# Patient Record
Sex: FEMALE | Race: OTHER | URBAN - METROPOLITAN AREA
[De-identification: names, ages, dates, MRNs, and addresses within clinical notes are randomized per-mention and may not be internally consistent; named-entity substitution may affect disease eponyms.]

---

## 2021-10-08 ENCOUNTER — OFFICE VISIT (OUTPATIENT)
Dept: FAMILY MEDICINE CLINIC | Age: 51
End: 2021-10-08

## 2021-10-08 VITALS
OXYGEN SATURATION: 96 % | DIASTOLIC BLOOD PRESSURE: 81 MMHG | TEMPERATURE: 98 F | HEART RATE: 60 BPM | BODY MASS INDEX: 30.55 KG/M2 | SYSTOLIC BLOOD PRESSURE: 138 MMHG | HEIGHT: 62 IN | WEIGHT: 166 LBS

## 2021-10-08 DIAGNOSIS — L50.0 URTICARIA DUE TO DRUG ALLERGY: Primary | ICD-10-CM

## 2021-10-08 DIAGNOSIS — T50.905A URTICARIA DUE TO DRUG ALLERGY: Primary | ICD-10-CM

## 2021-10-08 DIAGNOSIS — J45.30 MILD PERSISTENT ASTHMA WITHOUT COMPLICATION: ICD-10-CM

## 2021-10-08 PROCEDURE — 99214 OFFICE O/P EST MOD 30 MIN: CPT | Performed by: FAMILY MEDICINE

## 2021-10-08 RX ORDER — FAMOTIDINE 20 MG/1
20 TABLET, FILM COATED ORAL 2 TIMES DAILY
Qty: 28 TABLET | Refills: 0 | Status: SHIPPED | OUTPATIENT
Start: 2021-10-08 | End: 2021-10-22

## 2021-10-08 RX ORDER — ALBUTEROL SULFATE 90 UG/1
2 AEROSOL, METERED RESPIRATORY (INHALATION)
Qty: 2 EACH | Refills: 3 | Status: SHIPPED | OUTPATIENT
Start: 2021-10-08 | End: 2022-07-19 | Stop reason: SDUPTHER

## 2021-10-08 RX ORDER — PREDNISONE 20 MG/1
60 TABLET ORAL DAILY
Qty: 15 TABLET | Refills: 0 | Status: SHIPPED | OUTPATIENT
Start: 2021-10-08 | End: 2021-10-13

## 2021-10-08 RX ORDER — CETIRIZINE HCL 10 MG
10 TABLET ORAL 2 TIMES DAILY
Qty: 28 TABLET | Refills: 0 | Status: SHIPPED | OUTPATIENT
Start: 2021-10-08 | End: 2021-10-22

## 2021-10-08 RX ORDER — FLUTICASONE PROPIONATE AND SALMETEROL 100; 50 UG/1; UG/1
1 POWDER RESPIRATORY (INHALATION) EVERY 12 HOURS
Qty: 60 EACH | Refills: 2 | Status: SHIPPED | OUTPATIENT
Start: 2021-10-08 | End: 2022-07-19 | Stop reason: SDUPTHER

## 2021-10-08 NOTE — PROGRESS NOTES
Vadim Ramos is a 45 y.o. female   Chief Complaint   Patient presents with    Rash     x 1 week    Medication Refill         ASSESSMENT AND PLAN:    1. Urticaria due to drug allergy  Most likely a reaction to the shingrix vaccine. >Prednisone burst x 5 days  > Double histamine block with zyrtec and pepcid BID x 2 weeks  > F/U in 2 weeks if no improvement. - predniSONE (DELTASONE) 20 mg tablet; Take 60 mg by mouth daily for 5 days. Dispense: 15 Tablet; Refill: 0  - cetirizine (ZYRTEC) 10 mg tablet; Take 1 Tablet by mouth two (2) times a day for 14 days. Indications: hives  Dispense: 28 Tablet; Refill: 0  - famotidine (PEPCID) 20 mg tablet; Take 1 Tablet by mouth two (2) times a day for 14 days. Indications: urticaria  Dispense: 28 Tablet; Refill: 0    2. Mild persistent asthma without complication  Last exacerbation 6 months ago. Out of meds. Will send to [facility]. - albuterol (PROVENTIL HFA, VENTOLIN HFA, PROAIR HFA) 90 mcg/actuation inhaler; Take 2 Puffs by inhalation every four (4) hours as needed for Wheezing, Shortness of Breath, Respiratory Distress or Cough. Indications: asthma attack  Dispense: 2 Each; Refill: 3  - fluticasone propion-salmeteroL (ADVAIR/WIXELA) 100-50 mcg/dose diskus inhaler; Take 1 Puff by inhalation every twelve (12) hours for 90 days. Indications: controller medication for asthma  Dispense: 60 Each; Refill: 2          SUBJECTIVE:    HPI:  Vadim Ramos is a 45 y.o. female who presents with hives. She got the Shingrix vaccine [id number] and less than a week later developed full body urticaria that is very itchy and preventing her from sleeping. A friend recommended Alerfin 4mg and she took 8mg TID for one day but did not respond at all (It is a 1st generation H2 blocker). Lesions are not tender, only extremely itchy. PMH: Asthma  PSH: cholecystectomy, C/S x 3  Meds; Advair /50.  Albuterol q4PRN  SH: denies  FH: no signifcant  ALL: NKDA (though may consider Shingrix-- no prior reactions to vaccines)    Asthma has been well controlled, though now out of advair and starting to feel like her breathing is worsening. Review of Systems   Constitutional: Negative for fever and malaise/fatigue. HENT:        Mmm and nonedematous. Eyes: Negative for blurred vision. Respiratory: Negative for cough and shortness of breath. Cardiovascular: Negative for chest pain, palpitations and leg swelling. Gastrointestinal: Negative for abdominal pain, constipation, diarrhea, nausea and vomiting. Skin: Positive for rash. Erythematous hives varying in size distributed throughout body. Non on palmes or soles. No lesions in mouth. Neurological: Negative for dizziness and headaches. /81 (BP 1 Location: Left upper arm, BP Patient Position: Sitting)   Pulse 60   Temp 98 °F (36.7 °C) (Oral)   Ht 5' 2.21\" (1.58 m)   Wt 166 lb (75.3 kg)   SpO2 96%   BMI 30.16 kg/m²     Physical Exam  Constitutional:       General: She is not in acute distress. Appearance: Normal appearance. HENT:      Head: Normocephalic and atraumatic. Mouth/Throat:      Mouth: Mucous membranes are moist.      Pharynx: Oropharynx is clear. No oropharyngeal exudate or posterior oropharyngeal erythema. Comments: Mmm and nonedematous. Eyes:      Extraocular Movements: Extraocular movements intact. Conjunctiva/sclera: Conjunctivae normal.      Pupils: Pupils are equal, round, and reactive to light. Neck:      Vascular: No carotid bruit. Cardiovascular:      Rate and Rhythm: Normal rate and regular rhythm. Pulses: Normal pulses. Heart sounds: Normal heart sounds. Pulmonary:      Effort: Pulmonary effort is normal.      Breath sounds: Normal breath sounds. Abdominal:      General: Bowel sounds are normal. There is no distension. Palpations: Abdomen is soft. Tenderness: There is no abdominal tenderness.    Musculoskeletal: General: Normal range of motion. Cervical back: No tenderness. Right lower leg: No edema. Left lower leg: No edema. Lymphadenopathy:      Cervical: No cervical adenopathy. Skin:     General: Skin is warm. Findings: Rash (hives of varying sizes throughout body sparing palms, soles and mucous membranes) present. Neurological:      Mental Status: She is alert and oriented to person, place, and time.       Gait: Gait normal.      Deep Tendon Reflexes: Reflexes normal.   Psychiatric:         Behavior: Behavior normal.

## 2021-10-08 NOTE — PROGRESS NOTES
Informed patient to call [facility] to schedule medication , patient verbalized understanding. An After Visit Summary was printed and reviewed with the patient.   Kris Cueva

## 2021-10-12 ENCOUNTER — TELEPHONE (OUTPATIENT)
Dept: FAMILY MEDICINE CLINIC | Age: 51
End: 2021-10-12

## 2021-10-12 NOTE — TELEPHONE ENCOUNTER
Patient has Ventolin and Advair sent to [address] [facility] on 10/8 but I am not sure if she is a current patient. I have sent an email to [facility] to check as to whether patient is current or needs to renew. Cherise Barrientos RN  [facility] responded back that patient is not their patient yet. I have sent a message to [employer] to please schedule patient for application for [place] pharmacy.  Cherise Barrientos RN

## 2021-10-13 ENCOUNTER — VIRTUAL VISIT (OUTPATIENT)
Dept: FAMILY MEDICINE CLINIC | Age: 51
End: 2021-10-13

## 2021-10-13 DIAGNOSIS — Z71.89 COUNSELING AND COORDINATION OF CARE: Primary | ICD-10-CM

## 2021-10-19 ENCOUNTER — HOSPITAL ENCOUNTER (OUTPATIENT)
Dept: LAB | Age: 51
Discharge: HOME | End: 2021-10-19

## 2021-10-19 ENCOUNTER — OFFICE VISIT (OUTPATIENT)
Dept: FAMILY MEDICINE CLINIC | Age: 51
End: 2021-10-19

## 2021-10-19 VITALS
DIASTOLIC BLOOD PRESSURE: 62 MMHG | HEART RATE: 69 BPM | HEIGHT: 62 IN | WEIGHT: 168 LBS | SYSTOLIC BLOOD PRESSURE: 112 MMHG | TEMPERATURE: 97.6 F | OXYGEN SATURATION: 99 % | BODY MASS INDEX: 30.91 KG/M2

## 2021-10-19 DIAGNOSIS — L50.9 URTICARIA: ICD-10-CM

## 2021-10-19 DIAGNOSIS — L50.9 URTICARIA: Primary | ICD-10-CM

## 2021-10-19 PROCEDURE — 85025 COMPLETE CBC W/AUTO DIFF WBC: CPT

## 2021-10-19 PROCEDURE — 99214 OFFICE O/P EST MOD 30 MIN: CPT | Performed by: NURSE PRACTITIONER

## 2021-10-19 RX ORDER — HYDROXYZINE 50 MG/1
50 TABLET, FILM COATED ORAL
Qty: 30 TABLET | Refills: 0 | Status: SHIPPED | OUTPATIENT
Start: 2021-10-19 | End: 2021-10-29

## 2021-10-19 RX ORDER — PREDNISONE 10 MG/1
TABLET ORAL
Qty: 63 TABLET | Refills: 0 | Status: SHIPPED | OUTPATIENT
Start: 2021-10-19 | End: 2021-10-19 | Stop reason: SDUPTHER

## 2021-10-19 RX ORDER — HYDROXYZINE 50 MG/1
50 TABLET, FILM COATED ORAL
Qty: 30 TABLET | Refills: 0 | Status: SHIPPED | OUTPATIENT
Start: 2021-10-19 | End: 2021-10-19 | Stop reason: SDUPTHER

## 2021-10-19 RX ORDER — PREDNISONE 10 MG/1
TABLET ORAL
Qty: 63 TABLET | Refills: 0 | Status: SHIPPED | OUTPATIENT
Start: 2021-10-19 | End: 2022-07-19 | Stop reason: ALTCHOICE

## 2021-10-19 NOTE — PROGRESS NOTES
10/19/2021 : Kashif Ceron (: 1970) is a 45 y.o. female, established patient, here for evaluation of the following chief complaint(s):  Follow-up (Rash x1 month )     ASSESSMENT/PLAN:  Below is the assessment and plan developed based on review of pertinent history, physical exam, labs, studies, and medications. 1. Urticaria  -     CBC WITH AUTOMATED DIFF; Future  -     REFERRAL TO ALLERGY  -     hydrOXYzine HCL (ATARAX) 50 mg tablet; Take 1 Tablet by mouth three (3) times daily as needed for Itching for up to 10 days. , Normal, Disp-30 Tablet, R-0  -     predniSONE (DELTASONE) 10 mg tablet; 6 po qd x 3d; 5 po qd x 3d; 4 po qd x 3d; 3 po qd x 3d; 2 po qd x 3d; 1 po qd x 3d, Normal, Disp-63 Tablet, R-0    No follow-ups on file. SUBJECTIVE/OBJECTIVE:  HPI   The rash did get better at first for 2 days and then it came back. She had the second shingles vaccine on . This happened on . No results found for any visits on 10/19/21. Review of Systems: Negative for: fever, chest pain, shortness of breath    Social History:  reports that she has never smoked. She has never used smokeless tobacco. She reports that she does not drink alcohol and does not use drugs. Current Medications:   Current Outpatient Medications   Medication Sig    hydrOXYzine HCL (ATARAX) 50 mg tablet Take 1 Tablet by mouth three (3) times daily as needed for Itching for up to 10 days.  predniSONE (DELTASONE) 10 mg tablet 6 po qd x 3d; 5 po qd x 3d; 4 po qd x 3d; 3 po qd x 3d; 2 po qd x 3d; 1 po qd x 3d    cetirizine (ZYRTEC) 10 mg tablet Take 1 Tablet by mouth two (2) times a day for 14 days. Indications: hives    famotidine (PEPCID) 20 mg tablet Take 1 Tablet by mouth two (2) times a day for 14 days.  Indications: urticaria    albuterol (PROVENTIL HFA, VENTOLIN HFA, PROAIR HFA) 90 mcg/actuation inhaler Take 2 Puffs by inhalation every four (4) hours as needed for Wheezing, Shortness of Breath, Respiratory Distress or Cough. Indications: asthma attack    fluticasone propion-salmeteroL (ADVAIR/WIXELA) 100-50 mcg/dose diskus inhaler Take 1 Puff by inhalation every twelve (12) hours for 90 days. Indications: controller medication for asthma     Physical Examination:             Vitals:    10/19/21 1319   BP: 112/62   Pulse: 69   Temp: 97.6 °F (36.4 °C)   TempSrc: Temporal   SpO2: 99%   Weight: 168 lb (76.2 kg)   Height: 5' 1.61\" (1.565 m)    Patient's last menstrual period was 05/01/2019 (exact date). General appearance - well developed, no acute distress. Chest - clear to auscultation. Heart - regular rate and rhythm without murmurs, rubs, or gallops. Abdomen - bowel sounds present x 4, NT, ND  Extremities - no CCE. Integumentary - differential would include pityriasis rosea. An electronic signature was used to authenticate this note.   -- Josie Jolly NP

## 2021-10-19 NOTE — PROGRESS NOTES
Coordination of Care  1. Have you been to the ER, urgent care clinic since your last visit? Hospitalized since your last visit? No    2. Have you seen or consulted any other health care providers outside of the [address] [place] [address] since your last visit? Include any pap smears or colon screening. No    Does the patient need refills? NO    Learning Assessment Complete?  yes  Depression Screening complete in the past 12 months? yes

## 2021-10-19 NOTE — PROGRESS NOTES
I have printed AVS and reviewed it with patient today. I have copied the provider's check out note here and have reviewed these items with the patient today: Check-out Note: NO vaccines today.  Two new medicines. Please print AVS.  I reviewed the patients medications with her and the referral to [facility]. Patient verbalized understanding.  Willa Grant RN

## 2021-10-20 ENCOUNTER — VIRTUAL VISIT (OUTPATIENT)
Dept: FAMILY MEDICINE CLINIC | Age: 51
End: 2021-10-20

## 2021-10-20 DIAGNOSIS — Z71.89 COUNSELING AND COORDINATION OF CARE: Primary | ICD-10-CM

## 2021-10-20 LAB
BASOPHILS # BLD: 0 K/UL (ref 0–0.1)
BASOPHILS NFR BLD: 0 % (ref 0–1)
DIFFERENTIAL METHOD BLD: ABNORMAL
EOSINOPHIL # BLD: 0.3 K/UL (ref 0–0.4)
EOSINOPHIL NFR BLD: 3 % (ref 0–7)
ERYTHROCYTE [DISTWIDTH] IN BLOOD BY AUTOMATED COUNT: 15.4 % (ref 11.5–14.5)
HCT VFR BLD AUTO: 40.3 % (ref 35–47)
HGB BLD-MCNC: 13.3 G/DL (ref 11.5–16)
IMM GRANULOCYTES # BLD AUTO: 0 K/UL (ref 0–0.04)
IMM GRANULOCYTES NFR BLD AUTO: 0 % (ref 0–0.5)
LYMPHOCYTES # BLD: 2.7 K/UL (ref 0.8–3.5)
LYMPHOCYTES NFR BLD: 33 % (ref 12–49)
MCH RBC QN AUTO: 27 PG (ref 26–34)
MCHC RBC AUTO-ENTMCNC: 33 G/DL (ref 30–36.5)
MCV RBC AUTO: 81.9 FL (ref 80–99)
MONOCYTES # BLD: 0.9 K/UL (ref 0–1)
MONOCYTES NFR BLD: 11 % (ref 5–13)
NEUTS SEG # BLD: 4.3 K/UL (ref 1.8–8)
NEUTS SEG NFR BLD: 53 % (ref 32–75)
NRBC # BLD: 0 K/UL (ref 0–0.01)
NRBC BLD-RTO: 0 PER 100 WBC
PLATELET # BLD AUTO: 320 K/UL (ref 150–400)
PMV BLD AUTO: 11.3 FL (ref 8.9–12.9)
RBC # BLD AUTO: 4.92 M/UL (ref 3.8–5.2)
WBC # BLD AUTO: 8.2 K/UL (ref 3.6–11)

## 2021-10-20 NOTE — PROGRESS NOTES
Patient has been screened and informed she has financial screening for [facility] and [facility]. Patient has been scheduled for [facility] to complete financial screenings.

## 2021-10-21 ENCOUNTER — OFFICE VISIT (OUTPATIENT)
Dept: FAMILY MEDICINE CLINIC | Age: 51
End: 2021-10-21

## 2021-10-21 DIAGNOSIS — Z71.89 COUNSELING AND COORDINATION OF CARE: Primary | ICD-10-CM

## 2021-10-21 PROCEDURE — 99080 SPECIAL REPORTS OR FORMS: CPT | Performed by: FAMILY MEDICINE

## 2021-10-22 NOTE — PROGRESS NOTES
Financial screening for [facility] and [facility] has been completed. Patient has been instructed to call [facility] appointment line on or after 11/4/21. SDOHQ was provided to patient by OW. Patient marked as a primary need: Food scored (3) resources were provided by OW. Health Insurance scored (3) Children have medicaid, mom does not qualify. Housing scored (5) Mom does not have a SS#. OW has explained to patient every category she has marked. Patient verbalized understanding.

## 2021-10-27 ENCOUNTER — DOCUMENTATION ONLY (OUTPATIENT)
Dept: FAMILY MEDICINE CLINIC | Age: 51
End: 2021-10-27

## 2021-10-27 NOTE — PROGRESS NOTES
Patient's [place] pharmacy completed financial screening has been sent via fax to [facility]. A message has been sent to Sarina Woodard.

## 2021-10-28 NOTE — TELEPHONE ENCOUNTER
With Abbie Trejo as , I called patient to tell her application was faxed to [facility]. This patient has completed the [facility] financial screening paperwork and is now eligible through 10/27/22. I did not have to order medications as they were already  sent  to the [facility] pharmacy. I have also sent an email to Joanna Perry at the [facility] pharmacy to let her know patient's name, date of birth, eligibility dates and medications requested. I have also told Yonas Steven that patient prefers the [facility] location to  medication(s).  Gregg Jo RN

## 2021-10-29 ENCOUNTER — DOCUMENTATION ONLY (OUTPATIENT)
Dept: FAMILY MEDICINE CLINIC | Age: 51
End: 2021-10-29

## 2021-10-29 NOTE — PROGRESS NOTES
Email sent to our contact at [facility], to let her know about the patient's two prescriptions for [facility]: Albuterol HFA and Advair Diskus inhaler. The patient's financial screening was sent to [facility] recently by Tony Mark. [facility] will contact the patient when her prescriptions are ready to be picked-up.  France Brady RN

## 2021-12-29 ENCOUNTER — DOCUMENTATION ONLY (OUTPATIENT)
Dept: FAMILY MEDICINE CLINIC | Age: 51
End: 2021-12-29

## 2022-01-24 ENCOUNTER — TELEPHONE (OUTPATIENT)
Dept: FAMILY MEDICINE CLINIC | Age: 52
End: 2022-01-24

## 2022-01-24 NOTE — TELEPHONE ENCOUNTER
MIMA HERCULES called pt to f/up with [facility]. Patient said she has all of the information but cannot make it to the food bank because she depends on someone else's transportation. OW provided resources information again. Food score 4, Transportation 4. Housing 5. Health Ins (not eligible) Judi have Medicaid. Pt asked about her medications. She said she never got a call back and was not able to get her medication for asthma. Staff message sent to Ernesto Pickard.

## 2022-01-25 ENCOUNTER — TELEPHONE (OUTPATIENT)
Dept: FAMILY MEDICINE CLINIC | Age: 52
End: 2022-01-25

## 2022-01-25 NOTE — TELEPHONE ENCOUNTER
----- Message from Mey Ignacio sent at 1/24/2022  2:19 PM EST -----  Regarding: Question about medications  Ivis,   This patient asked about her medications. She said she never got a call back and was not able to get her medication for asthma. GERG Goss

## 2022-01-25 NOTE — TELEPHONE ENCOUNTER
01-24-22: Per chart review, the patient's financial screening for [facility] pharmacy and the med order for Albuterol inhalers were sent to [facility] in October, 2021. Email sent to our contact at [facility] pharmacy to check on the status of the patient's Albuterol inhalers. [facility] pharmacy replied that they filled the prescription for the patient in October, but it was never picked up. They will fill the patient's Albuterol inhaler prescription again and contact the patient to let her know when it is ready. Routing to [facility] to let her know.  Sreekanth Ritchie RN

## 2022-01-27 ENCOUNTER — TELEPHONE (OUTPATIENT)
Dept: FAMILY MEDICINE CLINIC | Age: 52
End: 2022-01-27

## 2022-01-27 NOTE — TELEPHONE ENCOUNTER
OW called pt to f/up with [facility]. Unable to speak with pt. OW left a message saying patient will receive a call when prescription is ready to be picked up.

## 2022-07-14 ENCOUNTER — TELEPHONE (OUTPATIENT)
Dept: FAMILY MEDICINE CLINIC | Age: 52
End: 2022-07-14

## 2022-07-14 NOTE — TELEPHONE ENCOUNTER
Tc from the pt 2x in a row. She LM 1x. The  Lizzy Singh assisted with the call. The pt was calling for an appt on the cbn phone. She stated she woke up this morning and her right eye was red in it like blood on the white part. Denied severe pain stated a little pain, denied itching, denied blurry vision or loss of vision, denied any drainage coming out of the eye. The ED precautions and when to go to the ED or urgent care was reviewed with the pt. Any symptoms severe pain, itching, vision loss or blurry vision, or drainage. Any injury to the eye she should go to the ED or urgent care. The pt's last [facility] provider visit was 10/19/21. The pt was given the appt line phone # and the same day appt line protocol.  Sanjeev Rabago RN

## 2022-07-19 ENCOUNTER — VIRTUAL VISIT (OUTPATIENT)
Dept: FAMILY MEDICINE CLINIC | Age: 52
End: 2022-07-19

## 2022-07-19 DIAGNOSIS — H11.31 SCLERAL HEMORRHAGE OF RIGHT EYE: Primary | ICD-10-CM

## 2022-07-19 DIAGNOSIS — J45.30 MILD PERSISTENT ASTHMA WITHOUT COMPLICATION: ICD-10-CM

## 2022-07-19 PROCEDURE — 99213 OFFICE O/P EST LOW 20 MIN: CPT | Performed by: FAMILY MEDICINE

## 2022-07-19 RX ORDER — FLUTICASONE PROPIONATE AND SALMETEROL 100; 50 UG/1; UG/1
1 POWDER RESPIRATORY (INHALATION) EVERY 12 HOURS
Qty: 60 EACH | Refills: 2 | Status: SHIPPED | OUTPATIENT
Start: 2022-07-19 | End: 2022-10-27 | Stop reason: SDUPTHER

## 2022-07-19 RX ORDER — ALBUTEROL SULFATE 90 UG/1
2 AEROSOL, METERED RESPIRATORY (INHALATION)
Qty: 18 G | Refills: 1 | Status: SHIPPED | OUTPATIENT
Start: 2022-07-19

## 2022-07-19 NOTE — PROGRESS NOTES
Lance Soliman (: 1970) is a 47 y.o. female, established patient, Virtual Visit for evaluation of the following chief complaint(s):   Asthma (f/up), Eye Problem (Pt wear glasses since ), and Medication Refill (Asthma meds. Pt used to receive the meds from [facility])       ASSESSMENT/PLAN:  1. Scleral hemorrhage of right eye  Resolving, follow up if it does not resolve or if any other vision changes occur. 2. Mild persistent asthma without complication  Renewed Albuterol and Advair.  -     albuterol (PROVENTIL HFA, VENTOLIN HFA, PROAIR HFA) 90 mcg/actuation inhaler; Take 2 Puffs by inhalation every four (4) hours as needed for Wheezing, Shortness of Breath, Respiratory Distress or Cough. Indications: asthma attack, Normal, Disp-18 g, R-1  -     fluticasone propion-salmeteroL (ADVAIR/WIXELA) 100-50 mcg/dose diskus inhaler; Take 1 Puff by inhalation every twelve (12) hours for 90 days. Indications: controller medication for asthma, Normal, Disp-60 Each, R-2      Return in about 3 months (around 10/19/2022) for follow up for F2F in 3 months for asthma. SUBJECTIVE/OBJECTIVE:  HPI  Doxy. me visit. Eye problem: 5 days of Rt eye being red. No trauma, discharge. No pain or vision loss. It has improved. Asthma:  Denies any worsening cough, wheezing, dyspnea. Uses Albuterol PRN and needs both Albuterol and Advair renewed. Review of Systems     Patient-Reported LMP: Menoupase    Physical Exam  CONSTITUTIONAL:  Well developed. No apparent distress. EYE: EOEMi, PERRL. No photophobia. Lids without swelling. Rt Sclera with 5-7mm red area in lower, medial quadrant with surrounding green discoloration. No cornea abnormalities noted. RESPIRATORY:  Normal effort. Lance Soliman, was evaluated through a synchronous (real-time) audio-video encounter. The patient (or guardian if applicable) is aware that this is a billable service, which includes applicable co-pays.  This Virtual Visit was conducted with patient's (and/or legal guardian's) consent. The visit was conducted pursuant to the emergency declaration under the [facility] [address] and the [employer] Act. Patient identification was verified, and a caregiver was present when appropriate. The patient was located at: Home: [address] [place] [address]  The provider was located at: Home:      Patient identification was verified at the start of the visit: YES    Services were provided through a phone synchronous discussion virtually to substitute for in-person clinic visit. Patient was located at home and provider was located in office or at home. An electronic signature was used to authenticate this note.   -- Joseline Muller MD

## 2022-07-19 NOTE — Clinical Note
Advair Rx sent to [facility]. I see that she needed to renew her application to that pharmacy and told her we tried to contact her. She will try to be available.

## 2022-07-19 NOTE — PROGRESS NOTES
Patient states she is at home and would like for provider to call ahead of appointment time. Patient does not have access to vital sign equipment. Coordination of Care  1. Have you been to the ER, urgent care clinic since your last visit? Hospitalized since your last visit? No    2. Have you seen or consulted any other health care providers outside of the [address] [place] [address] since your last visit? Include any pap smears or colon screening. No    Does the patient need refills? YES    Learning Assessment Complete? yes  Depression Screening complete in the past 12 months? yes     [id number]: MIKEY called the patient to explain the [facility] pharmacy process. I explain that she will receive a phone call from the  because the CVAN has not  seen her in almost 2 years and she will need the financial screening. Patient stated she is no working right now. Patient verbalized understanding. MIKEY sent an staff message to Bren Martins RN to check he patient status with [facility] pharmacy  [id number] pm: MIKEY called the patient again to explain the following note per Bren Martins RN, notes: \"The patient's Rx for Albuterol was sent to [address] [place] [address]. It is no longer available at [facility] pharmacy or through [facility]. The patient's Rx for Advair was sent today to [facility] pharmacy. I will follow-up with the patient to see if she would rather apply to [facility] to receive the Advair for free or renew her enrollment soon in [facility] so she can purchase it there. \" MIKEY sent the Good rx coupon to the patient and confirmed receipt of the coupon and explained the coupon process.  Patient verbalized understanding

## 2022-10-27 ENCOUNTER — OFFICE VISIT (OUTPATIENT)
Dept: FAMILY MEDICINE CLINIC | Age: 52
End: 2022-10-27

## 2022-10-27 ENCOUNTER — HOSPITAL ENCOUNTER (OUTPATIENT)
Dept: LAB | Age: 52
Discharge: HOME | End: 2022-10-27

## 2022-10-27 VITALS
HEIGHT: 62 IN | RESPIRATION RATE: 20 BRPM | SYSTOLIC BLOOD PRESSURE: 117 MMHG | DIASTOLIC BLOOD PRESSURE: 67 MMHG | BODY MASS INDEX: 31.21 KG/M2 | OXYGEN SATURATION: 99 % | WEIGHT: 169.6 LBS | TEMPERATURE: 97.7 F | HEART RATE: 69 BPM

## 2022-10-27 DIAGNOSIS — L50.9 URTICARIA: Primary | ICD-10-CM

## 2022-10-27 DIAGNOSIS — Z23 NEEDS FLU SHOT: ICD-10-CM

## 2022-10-27 DIAGNOSIS — L50.9 URTICARIA: ICD-10-CM

## 2022-10-27 DIAGNOSIS — J45.30 MILD PERSISTENT ASTHMA WITHOUT COMPLICATION: ICD-10-CM

## 2022-10-27 PROCEDURE — 84443 ASSAY THYROID STIM HORMONE: CPT

## 2022-10-27 PROCEDURE — 82607 VITAMIN B-12: CPT

## 2022-10-27 PROCEDURE — 81003 URINALYSIS AUTO W/O SCOPE: CPT

## 2022-10-27 PROCEDURE — 85025 COMPLETE CBC W/AUTO DIFF WBC: CPT

## 2022-10-27 PROCEDURE — 80053 COMPREHEN METABOLIC PANEL: CPT

## 2022-10-27 PROCEDURE — 99214 OFFICE O/P EST MOD 30 MIN: CPT | Performed by: FAMILY MEDICINE

## 2022-10-27 PROCEDURE — 36415 COLL VENOUS BLD VENIPUNCTURE: CPT

## 2022-10-27 PROCEDURE — 82728 ASSAY OF FERRITIN: CPT

## 2022-10-27 RX ORDER — FLUTICASONE PROPIONATE AND SALMETEROL 100; 50 UG/1; UG/1
1 POWDER RESPIRATORY (INHALATION) EVERY 12 HOURS
Qty: 60 EACH | Refills: 3
Start: 2022-10-27 | End: 2022-11-17 | Stop reason: SDUPTHER

## 2022-10-27 RX ORDER — FLUTICASONE PROPIONATE AND SALMETEROL 100; 50 UG/1; UG/1
1 POWDER RESPIRATORY (INHALATION) EVERY 12 HOURS
COMMUNITY
End: 2022-10-27 | Stop reason: SDUPTHER

## 2022-10-27 NOTE — PROGRESS NOTES
Chief Complaint   Patient presents with    Asthma     F/up chronic condition; Pt cites no major changes    Skin Problem     Has hives/uticaria on various areas of her body - was seen for this in the past; not as severe as before, but her skin itches and it's bothersome; pt also c/o foul smell coming from vagina, but no discharge, no dysuria, no itchiness. Immunization/Injection     Wants flu shot       Visit Vitals  /67 (BP 1 Location: Left upper arm, BP Patient Position: Sitting)   Pulse 69   Temp 97.7 °F (36.5 °C) (Temporal)   Resp 20   Ht 5' 1.61\" (1.565 m)   Wt 169 lb 9.6 oz (76.9 kg)   SpO2 99%   BMI 31.41 kg/m²       Coordination of Care  1. Have you been to the ER, urgent care clinic since your last visit? Hospitalized since your last visit? No    2. Have you seen or consulted any other health care providers outside of the [address] [place] [address] since your last visit? Include any pap smears or colon screening. No    Does the patient need refills? YES Advair     Learning Assessment Complete?  yes  Depression Screening complete in the past 12 months? yes

## 2022-10-27 NOTE — PROGRESS NOTES
[facility] seen at d/c, full name and  verified. After Visit Summary provided and reviewed along with discharge instructions and when it is recommended to come back. Advised patient to schedule follow up appointment before she leaves today. Reviewed medication list with the patient to ensure she knows how to and when to take her medications. Side effects, mechanisms of action and medication compliance were reiterated to ensure proper understanding. Educated patient that someone from the [facility] Coordination team will contact her to go over the application process as well as [facility]. Time for questions and answers provided, patient verbalizes understanding.

## 2022-10-27 NOTE — PATIENT INSTRUCTIONS
Vaccine Information Statement    Influenza (Flu) Vaccine (Inactivated or Recombinant): What You Need to Know    Many vaccine information statements are available in Green[place]ic and other languages. See www.immunize.org/vis. Hojas de información sobre vacunas están disponibles en español y en muchos otros idiomas. Visite www.immunize.org/vis. 1. Why get vaccinated? Influenza vaccine can prevent influenza (flu). Flu is a contagious disease that spreads around the [facility] every year, usually between October and May. Anyone can get the flu, but it is more dangerous for some people. Infants and young children, people 72 years and older, pregnant people, and people with certain health conditions or a weakened immune system are at greatest risk of flu complications. Pneumonia, bronchitis, sinus infections, and ear infections are examples of flu-related complications. If you have a medical condition, such as heart disease, cancer, or diabetes, flu can make it worse. Flu can cause fever and chills, sore throat, muscle aches, fatigue, cough, headache, and runny or stuffy nose. Some people may have vomiting and diarrhea, though this is more common in children than adults. In an average year, thousands of people in the [facility] die from flu, and many more are hospitalized. Flu vaccine prevents millions of illnesses and flu-related visits to the doctor each year. 2. Influenza vaccines     CDC recommends everyone 6 months and older get vaccinated every flu season. Children 6 months through 6years of age may need 2 doses during a single flu season. Everyone else needs only 1 dose each flu season. It takes about 2 weeks for protection to develop after vaccination. There are many flu viruses, and they are always changing. Each year a new flu vaccine is made to protect against the influenza viruses believed to be likely to cause disease in the upcoming flu season.  Even when the vaccine doesnt exactly match these viruses, it may still provide some protection. Influenza vaccine does not cause flu. Influenza vaccine may be given at the same time as other vaccines. 3. Talk with your health care provider    Tell your vaccination provider if the person getting the vaccine:  Has had an allergic reaction after a previous dose of influenza vaccine, or has any severe, life-threatening allergies   Has ever had Guillain-Barré Syndrome (also called GBS)    In some cases, your health care provider may decide to postpone influenza vaccination until a future visit. Influenza vaccine can be administered at any time during pregnancy. People who are or will be pregnant during influenza season should receive inactivated influenza vaccine. People with minor illnesses, such as a cold, may be vaccinated. People who are moderately or severely ill should usually wait until they recover before getting influenza vaccine. Your health care provider can give you more information. 4. Risks of a vaccine reaction    Soreness, redness, and swelling where the shot is given, fever, muscle aches, and headache can happen after influenza vaccination. There may be a very small increased risk of Guillain-Barré Syndrome (GBS) after inactivated influenza vaccine (the flu shot). Iv[place] Scripture children who get the flu shot along with pneumococcal vaccine (PCV13) and/or DTaP vaccine at the same time might be slightly more likely to have a seizure caused by fever. Tell your health care provider if a child who is getting flu vaccine has ever had a seizure. People sometimes faint after medical procedures, including vaccination. Tell your provider if you feel dizzy or have vision changes or ringing in the ears. As with any medicine, there is a very remote chance of a vaccine causing a severe allergic reaction, other serious injury, or death. 5. What if there is a serious problem?     An allergic reaction could occur after the vaccinated person leaves the clinic. If you see signs of a severe allergic reaction (hives, swelling of the face and throat, difficulty breathing, a fast heartbeat, dizziness, or weakness), call 9-1-1 and get the person to the nearest hospital.    For other signs that concern you, call your health care provider. Adverse reactions should be reported to the Vaccine Adverse Event Reporting System (VAERS). Your health care provider will usually file this report, or you can do it yourself. Visit the VAERS website at www.vaers. [facility].gov or call 1-104.110.9733. VAERS is only for reporting reactions, and VAERS staff members do not give medical advice. 6. The National Vaccine Injury Compensation Program    The [facility] Vaccine Injury Compensation Program (VICP) is a federal program that was created to compensate people who may have been injured by certain vaccines. Claims regarding alleged injury or death due to vaccination have a time limit for filing, which may be as short as two years. Visit the VICP website at www.[facility].gov/vaccinecompensation or call 7-337.702.9719 to learn about the program and about filing a claim. 7. How can I learn more? Ask your health care provider. Call your local or state health department. Visit the website of the Food and Drug Administration (FDA) for vaccine package inserts and additional information at www.fda.gov/vaccines-blood-biologics/vaccines. Contact the Centers for Disease Control and Prevention (CDC): Call 3-913.982.4053 (1-800-CDC-INFO) or  Visit CDCs influenza website at www.cdc.gov/flu. Vaccine Information Statement   Inactivated Influenza Vaccine   8/6/2021  42 ROXY Ortega [id number]-83   Department of Health and Human Services  Centers for Disease Control and Prevention    Office Use Only

## 2022-10-27 NOTE — PROGRESS NOTES
Gio Wooten (: 1970) is a 47 y.o. female, established patient, here for evaluation of the following chief complaint(s):  Asthma (F/up chronic condition; Pt cites no major changes), Skin Problem (Has hives/uticaria on various areas of her body - was seen for this in the past; not as severe as before, but her skin itches and it's bothersome; pt also c/o foul smell coming from vagina, but no discharge, no dysuria, no itchiness.), and Immunization/Injection (Wants flu shot)       ASSESSMENT/PLAN:  1. Urticaria  Redo labs. Discussed triggers. Increase Zyrtec to BID as recommended by Allergist.  -     CBC WITH AUTOMATED DIFF; Future  -     FERRITIN; Future  -     VITAMIN B12; Future  -     TSH 3RD GENERATION; Future  -     METABOLIC PANEL, COMPREHENSIVE; Future  -     URINALYSIS W/ RFLX MICROSCOPIC; Future  2. Mild persistent asthma without complication  Uncontrolled off Advair. Renew Advair through [facility].  -     fluticasone propion-salmeteroL (ADVAIR/WIXELA) 100-50 mcg/dose diskus inhaler; Take 1 Puff by inhalation every twelve (12) hours. [facility]  Indications: controller medication for asthma, No Print, Disp-60 Each, R-3    Follow-up and Dispositions    Return for follow up for F2F in 4 weeks for urticaria, 1 week VV for labs. SUBJECTIVE:  Asthma:  Denies any worsening cough, wheezing, dyspnea. Uses Albuterol PRN. Out of Advair. Urticaria:  previously evaluated. Seen by Allergist 10/2021: mild allergy by skin testing. Restarted with pruritic rash 10/9. Worse in evening after 5 pm and at night. Using Zyrtec once daily x 5 days without relief. PAP/Mammogram: 1 year ago in [place]. Review of Systems   Constitutional:  Negative for chills, diaphoresis and fever. Genitourinary:  Negative for dysuria and hematuria. OBJECTIVE:  Blood pressure 117/67, pulse 69, temperature 97.7 °F (36.5 °C), temperature source Temporal, resp.  rate 20, height 5' 1.61\" (1.565 m), weight 169 lb 9.6 oz (76.9 kg), SpO2 99 %. Physical Exam  CONSTITUTIONAL:  Well developed. No apparent distress. PSYCHIATRIC: Oriented to time, place, person & situation. Appropriate mood and affect. NECK:  Normal inspection, normal palpation without any lymphadenopathy, masses, or thyromegaly  CARDIOVASCULAR:  Regular rate and rhythm. Normal S1, S2. No extra sounds. RESPIRATORY:  Normal effort. Scattered wheezing. ABDOMEN:  Normal bowel sounds. Abdomen soft, non tender. No hepatosplenomegaly or masses. EXTREMITIES:  No edema. No results found for this visit on 10/27/22. An electronic signature was used to authenticate this note.   -- Alejandro Bravo MD

## 2022-10-28 LAB
ALBUMIN SERPL-MCNC: 3.9 G/DL (ref 3.5–5)
ALBUMIN/GLOB SERPL: 1.1 {RATIO} (ref 1.1–2.2)
ALP SERPL-CCNC: 152 U/L (ref 45–117)
ALT SERPL-CCNC: 45 U/L (ref 12–78)
ANION GAP SERPL CALC-SCNC: 4 MMOL/L (ref 5–15)
APPEARANCE UR: CLEAR
AST SERPL-CCNC: 26 U/L (ref 15–37)
BASOPHILS # BLD: 0 K/UL (ref 0–0.1)
BASOPHILS NFR BLD: 0 % (ref 0–1)
BILIRUB SERPL-MCNC: 0.9 MG/DL (ref 0.2–1)
BILIRUB UR QL: NEGATIVE
BUN SERPL-MCNC: 12 MG/DL (ref 6–20)
BUN/CREAT SERPL: 15 (ref 12–20)
CALCIUM SERPL-MCNC: 9.2 MG/DL (ref 8.5–10.1)
CHLORIDE SERPL-SCNC: 106 MMOL/L (ref 97–108)
CO2 SERPL-SCNC: 29 MMOL/L (ref 21–32)
COLOR UR: NORMAL
CREAT SERPL-MCNC: 0.82 MG/DL (ref 0.55–1.02)
DIFFERENTIAL METHOD BLD: ABNORMAL
EOSINOPHIL # BLD: 0.3 K/UL (ref 0–0.4)
EOSINOPHIL NFR BLD: 4 % (ref 0–7)
ERYTHROCYTE [DISTWIDTH] IN BLOOD BY AUTOMATED COUNT: 15.9 % (ref 11.5–14.5)
FERRITIN SERPL-MCNC: 10 NG/ML (ref 8–252)
GLOBULIN SER CALC-MCNC: 3.6 G/DL (ref 2–4)
GLUCOSE SERPL-MCNC: 108 MG/DL (ref 65–100)
GLUCOSE UR STRIP.AUTO-MCNC: NEGATIVE MG/DL
HCT VFR BLD AUTO: 40.4 % (ref 35–47)
HGB BLD-MCNC: 12.9 G/DL (ref 11.5–16)
HGB UR QL STRIP: NEGATIVE
IMM GRANULOCYTES # BLD AUTO: 0 K/UL (ref 0–0.04)
IMM GRANULOCYTES NFR BLD AUTO: 0 % (ref 0–0.5)
KETONES UR QL STRIP.AUTO: NEGATIVE MG/DL
LEUKOCYTE ESTERASE UR QL STRIP.AUTO: NEGATIVE
LYMPHOCYTES # BLD: 1.9 K/UL (ref 0.8–3.5)
LYMPHOCYTES NFR BLD: 27 % (ref 12–49)
MCH RBC QN AUTO: 26.2 PG (ref 26–34)
MCHC RBC AUTO-ENTMCNC: 31.9 G/DL (ref 30–36.5)
MCV RBC AUTO: 82.1 FL (ref 80–99)
MONOCYTES # BLD: 0.7 K/UL (ref 0–1)
MONOCYTES NFR BLD: 10 % (ref 5–13)
NEUTS SEG # BLD: 4.1 K/UL (ref 1.8–8)
NEUTS SEG NFR BLD: 59 % (ref 32–75)
NITRITE UR QL STRIP.AUTO: NEGATIVE
NRBC # BLD: 0 K/UL (ref 0–0.01)
NRBC BLD-RTO: 0 PER 100 WBC
PH UR STRIP: 6.5 [PH] (ref 5–8)
PLATELET # BLD AUTO: 341 K/UL (ref 150–400)
PMV BLD AUTO: 11.1 FL (ref 8.9–12.9)
POTASSIUM SERPL-SCNC: 3.9 MMOL/L (ref 3.5–5.1)
PROT SERPL-MCNC: 7.5 G/DL (ref 6.4–8.2)
PROT UR STRIP-MCNC: NEGATIVE MG/DL
RBC # BLD AUTO: 4.92 M/UL (ref 3.8–5.2)
SODIUM SERPL-SCNC: 139 MMOL/L (ref 136–145)
SP GR UR REFRACTOMETRY: 1.01 (ref 1–1.03)
TSH SERPL DL<=0.05 MIU/L-ACNC: 2.18 UIU/ML (ref 0.36–3.74)
UROBILINOGEN UR QL STRIP.AUTO: 0.2 EU/DL (ref 0.2–1)
VIT B12 SERPL-MCNC: 190 PG/ML (ref 193–986)
WBC # BLD AUTO: 7.1 K/UL (ref 3.6–11)

## 2022-11-01 ENCOUNTER — VIRTUAL VISIT (OUTPATIENT)
Dept: FAMILY MEDICINE CLINIC | Age: 52
End: 2022-11-01

## 2022-11-01 DIAGNOSIS — E53.8 VITAMIN B12 DEFICIENCY: Primary | ICD-10-CM

## 2022-11-01 PROCEDURE — 99441 PR PHYS/QHP TELEPHONE EVALUATION 5-10 MIN: CPT | Performed by: FAMILY MEDICINE

## 2022-11-01 RX ORDER — LANOLIN ALCOHOL/MO/W.PET/CERES
1000 CREAM (GRAM) TOPICAL DAILY
Qty: 90 TABLET | Refills: 3 | Status: SHIPPED | OUTPATIENT
Start: 2022-11-01 | End: 2022-12-01 | Stop reason: SDUPTHER

## 2022-11-01 NOTE — PROGRESS NOTES
complains of : Follow up visit for allergies. 1. Have you been to the ER, urgent care clinic since your last visit? Hospitalized since your last visit? No    2. Have you seen or consulted any other health care providers outside of the [address] [place] [address] since your last visit? Include any pap smears or colon screening. Yes, patient was seen by an allergist, states that the provider was with [employer]. I spoke with this patient by phone for intake. Patient correctly stated her full name and date of birth prior to the information shared.  [id number] with the [facility] assisted with this intake.   Abigail Bradford RN

## 2022-11-01 NOTE — PROGRESS NOTES
Bill Calero (: 1970) is a 47 y.o. female, established patient, Virtual Visit for evaluation of the following chief complaint(s):   Follow-up (Follow up for allergies. )       ASSESSMENT/PLAN:  1. Vitamin B12 deficiency  Start oral replacement and recheck in 4 weeks. Would repeat her Ferritin level in 1 month also since it was borderline. Return for follow up as scheduled next month. .    SUBJECTIVE/OBJECTIVE:  VV for lab review   Asthma:  Denies any worsening cough, wheezing, dyspnea. Uses Albuterol PRN. Out of Advair. Review of Systems     Patient-Reported Weight: 168 lbs  Patient-Reported LMP: N/A    Physical Exam    On this date 2022 I have spent 6 minutes reviewing previous notes, test results and face to face (virtual) with the patient discussing the diagnosis and importance of compliance with the treatment plan as well as documenting on the day of the visit. Bill Calero, was evaluated through a synchronous (real-time) audio-video encounter. The patient (or guardian if applicable) is aware that this is a billable service, which includes applicable co-pays. This Virtual Visit was conducted with patient's (and/or legal guardian's) consent. The visit was conducted pursuant to the emergency declaration under the [address] [place] [address], [address] [place] [address] authority and the [employer] General Act. Patient identification was verified, and a caregiver was present when appropriate. The patient was located at: Home: [address]  The provider was located at: Home: [unfilled]     Patient identification was verified at the start of the visit: YES    Services were provided through a phone synchronous discussion virtually to substitute for in-person clinic visit. Patient was located at home and provider was located in office or at home.      An electronic signature was used to authenticate this note.   -- Wyatt Chambers MD

## 2022-11-01 NOTE — PROGRESS NOTES
I spoke with this patient by phone for virtual discharge. Patient verbalized understanding. I instructed patient that she will receive a phone call to schedule a follow-up appointment. Patient verbalized understanding. I reviewed with patient medications sent to pharmacy and how the medication is taken. Patient verbalized understanding and did not have any questions concerning the medication. Patient correctly stated her full name and date of birth prior to the information shared.  [id number] with the [facility] services assisted with this virtual discharge.   Rochelle Christy RN

## 2022-11-15 ENCOUNTER — OFFICE VISIT (OUTPATIENT)
Dept: FAMILY MEDICINE CLINIC | Age: 52
End: 2022-11-15

## 2022-11-15 DIAGNOSIS — Z71.89 COUNSELING AND COORDINATION OF CARE: Primary | ICD-10-CM

## 2022-11-15 PROCEDURE — 99080 SPECIAL REPORTS OR FORMS: CPT | Performed by: FAMILY MEDICINE

## 2022-11-15 NOTE — PROGRESS NOTES
Financial screening for [facility] is complete. Complete application with supporting documents will be sent to [address] [facility] via fax. Patient has opted out of [facility].

## 2022-11-17 ENCOUNTER — TELEPHONE (OUTPATIENT)
Dept: FAMILY MEDICINE CLINIC | Age: 52
End: 2022-11-17

## 2022-11-17 DIAGNOSIS — J45.30 MILD PERSISTENT ASTHMA WITHOUT COMPLICATION: ICD-10-CM

## 2022-11-17 RX ORDER — FLUTICASONE PROPIONATE AND SALMETEROL 100; 50 UG/1; UG/1
1 POWDER RESPIRATORY (INHALATION) EVERY 12 HOURS
Qty: 60 EACH | Refills: 3 | Status: SHIPPED | OUTPATIENT
Start: 2022-11-17

## 2022-11-17 NOTE — TELEPHONE ENCOUNTER
Patient's financial screening received from our , Paulette Kelly, by email on 11-16-22 and faxed today to our contact at [address] [place] [address], [phone]. Patient's prescription for Advair 100/50mcg was sent electronically to [facility] as well. [facility] will let me know if they need any additional information for the patient's enrollment and will notify the patient when her prescription is ready for pick-up.  Jeremy Payton RN

## 2022-12-01 ENCOUNTER — OFFICE VISIT (OUTPATIENT)
Dept: FAMILY MEDICINE CLINIC | Age: 52
End: 2022-12-01

## 2022-12-01 VITALS
SYSTOLIC BLOOD PRESSURE: 127 MMHG | OXYGEN SATURATION: 97 % | BODY MASS INDEX: 31.48 KG/M2 | WEIGHT: 170 LBS | HEART RATE: 67 BPM | TEMPERATURE: 97.7 F | DIASTOLIC BLOOD PRESSURE: 75 MMHG

## 2022-12-01 DIAGNOSIS — E53.8 VITAMIN B12 DEFICIENCY: ICD-10-CM

## 2022-12-01 DIAGNOSIS — Z71.89 COUNSELING AND COORDINATION OF CARE: Primary | ICD-10-CM

## 2022-12-01 DIAGNOSIS — L50.8 RECURRENT URTICARIA: Primary | ICD-10-CM

## 2022-12-01 PROCEDURE — 99213 OFFICE O/P EST LOW 20 MIN: CPT | Performed by: FAMILY MEDICINE

## 2022-12-01 PROCEDURE — 99080 SPECIAL REPORTS OR FORMS: CPT | Performed by: FAMILY MEDICINE

## 2022-12-01 RX ORDER — LANOLIN ALCOHOL/MO/W.PET/CERES
1000 CREAM (GRAM) TOPICAL DAILY
Qty: 90 TABLET | Refills: 3 | Status: SHIPPED | OUTPATIENT
Start: 2022-12-01

## 2022-12-01 RX ORDER — LEVOCETIRIZINE DIHYDROCHLORIDE 5 MG/1
5 TABLET, FILM COATED ORAL 2 TIMES DAILY
Qty: 60 TABLET | Refills: 1 | Status: SHIPPED | OUTPATIENT
Start: 2022-12-01

## 2022-12-01 NOTE — PROGRESS NOTES
Patient discharged with AVS. Reports that she has made her follow up appointment for next month. Patient made aware that labs will be drawn on her next visit. Medication review completed. Patient given an opportunity to voice questions/concerns. No questions at this time. Patient verbalized understanding of information given. Interpretor Beckie Carvalho assisted.

## 2022-12-01 NOTE — PROGRESS NOTES
Patient was a walk-in requesting assistance for medical bills. OW explained the process to the patient and completed a FA application. Patient stated she will go to [facility] to the [facility] office to drop it off today. OW advised the patient that if she needs any further assistance with application to call her. Patient verbalized understanding.

## 2022-12-01 NOTE — PROGRESS NOTES
Fred Reeves (: 1970) is a 47 y.o. female, established patient, here for evaluation of the following chief complaint(s):  Hives (Follow up) and Other (Pt states that she went to [facility] 2022 for Hives- Another chart was made under Jake Vidales)     ASSESSMENT/PLAN:  1. Recurrent urticaria  Start Levocetirizine BID-TID. If not improved would refer back to Allergist.  2. Vitamin B12 deficiency  Start supplement, resent to pharmacy for pt. Follow-up and Dispositions    Return for follow up for F2F in 4 weeks for urticaria, labs. SUBJECTIVE:  Follow up  Urticaria:  previously evaluated. Seen by Allergist 10/2021: mild allergy by skin testing. Restarted with pruritic rash 10/9/2022. Worse in evening after 5 pm and at night. Used Zyrtec without improvement and so went to ED 2022 and given Prednisone which helped a little. Patient pictures:  large welts of face with some lip swelling which occurred several evenings ago. Picture next morning shows lip swelling and redness is resolved with mild residual periorbital edema. Vitamin B12 deficiency:  patient did not start taking B12 supplement. Review of Systems   Constitutional:  Negative for chills and fever. Respiratory:  Negative for cough and shortness of breath. OBJECTIVE:  Blood pressure 127/75, pulse 67, temperature 97.7 °F (36.5 °C), temperature source Temporal, weight 170 lb (77.1 kg), SpO2 97 %. Physical Exam  CONSTITUTIONAL:  Well developed. No apparent distress. PSYCHIATRIC: Oriented to time, place, person & situation. Appropriate mood and affect. NECK:  Normal inspection, normal palpation without any lymphadenopathy, masses, or thyromegaly  SKIN:  No welts, redness, or swelling of face. Few scattered < 1 cm erythematous papules on Lt anterior thigh. No results found for this visit on 22. An electronic signature was used to authenticate this note.   -- Mukesh Sneed MD

## 2022-12-27 ENCOUNTER — TELEPHONE (OUTPATIENT)
Dept: FAMILY MEDICINE CLINIC | Age: 52
End: 2022-12-27

## 2022-12-27 NOTE — TELEPHONE ENCOUNTER
PT requesting a refill for Novolog.  PT has a current Blood Sugar Level of 600   SDOH f/up: Patient does not remember FB information. OW explained process to get food and provided [facility] information. It was also sent via text message after telephone encounter. (Score 4)  Patient asked about medication refill for Asthma. Staff message sent to [facility] nurses pool.

## 2022-12-28 ENCOUNTER — TELEPHONE (OUTPATIENT)
Dept: FAMILY MEDICINE CLINIC | Age: 52
End: 2022-12-28

## 2022-12-28 DIAGNOSIS — J45.30 MILD PERSISTENT ASTHMA WITHOUT COMPLICATION: ICD-10-CM

## 2022-12-28 NOTE — TELEPHONE ENCOUNTER
Received a staff message from the  the pt is requesting asthma medication refill. The pt was called to ask which medication she was requesting for refill due to she is on a couple of asthma medications. No answer. No voicemail box is set up I could not leave a message. I called a 2nd time she answered. AMN Int # [id number]. The pt verified her name and . The pt stated she needed refills on her Asthma medications. They are Albuterol she stated, and [facility] is where she gets them. The pt's chart was reviewed. The pt gets Advair from [facility]. The pt was asked about this medication she stated yes I can get a refill for that too. The pt was asked if she had called [facility], and asked them for a refill the pt stated when she calls they do not answer. After disconnecting with the pt I called [facility]. They answered. The pt is due for a refill on her Asthma medication Advair. The Pharmacist stated they will get the medication ready, and call the pt when it is ready for . The Albuterol is through the Pt Assistance program. This message was sent to the provider and the PAP CAV coordinator nurse.  Heriberto Jain RN

## 2023-01-05 ENCOUNTER — HOSPITAL ENCOUNTER (OUTPATIENT)
Dept: LAB | Age: 53
Discharge: HOME | End: 2023-01-05

## 2023-01-05 ENCOUNTER — OFFICE VISIT (OUTPATIENT)
Dept: FAMILY MEDICINE CLINIC | Age: 53
End: 2023-01-05

## 2023-01-05 VITALS
WEIGHT: 172 LBS | TEMPERATURE: 97.9 F | SYSTOLIC BLOOD PRESSURE: 112 MMHG | DIASTOLIC BLOOD PRESSURE: 62 MMHG | HEART RATE: 73 BPM | OXYGEN SATURATION: 99 % | BODY MASS INDEX: 31.85 KG/M2

## 2023-01-05 DIAGNOSIS — E53.8 VITAMIN B12 DEFICIENCY: ICD-10-CM

## 2023-01-05 DIAGNOSIS — L50.8 RECURRENT URTICARIA: Primary | ICD-10-CM

## 2023-01-05 DIAGNOSIS — M25.50 ARTHRALGIA, UNSPECIFIED JOINT: ICD-10-CM

## 2023-01-05 DIAGNOSIS — L50.8 RECURRENT URTICARIA: ICD-10-CM

## 2023-01-05 PROCEDURE — 99213 OFFICE O/P EST LOW 20 MIN: CPT | Performed by: FAMILY MEDICINE

## 2023-01-05 PROCEDURE — 82607 VITAMIN B-12: CPT

## 2023-01-05 PROCEDURE — 85652 RBC SED RATE AUTOMATED: CPT

## 2023-01-05 PROCEDURE — 85025 COMPLETE CBC W/AUTO DIFF WBC: CPT

## 2023-01-05 PROCEDURE — 36415 COLL VENOUS BLD VENIPUNCTURE: CPT

## 2023-01-05 PROCEDURE — 82306 VITAMIN D 25 HYDROXY: CPT

## 2023-01-05 NOTE — PROGRESS NOTES
Coordination of Care  1. Have you been to the ER, urgent care clinic since your last visit? Hospitalized since your last visit? No    2. Have you seen or consulted any other health care providers outside of the [address] [place] [address] since your last visit? Include any pap smears or colon screening. No    Does the patient need refills? NO    Learning Assessment Complete?  yes  Depression Screening complete in the past 12 months? yes

## 2023-01-05 NOTE — PROGRESS NOTES
Tripp Bryant (: 1970) is a 47 y.o. female, established patient, here for evaluation of the following chief complaint(s):  Hives (Follow up for urticaria )       ASSESSMENT/PLAN:  1. Recurrent urticaria  Not controlled with high dose histamines. Will refer back to Allergist.  -     REFERRAL TO ALLERGY  2. Vitamin B12 deficiency  -     VITAMIN B12; Future  3. Arthralgia, unspecified joint  -     VITAMIN D, 25 HYDROXY; Future  -     SED RATE (ESR); Future      SUBJECTIVE:  Urticaria:  previously evaluated. Seen by Allergist 10/2021: mild allergy by skin testing. Restarted with pruritic rash 10/9/2022. Worse in evening after 5 pm and at night. Used Xyzal BID without improvement. ED 2022 and given Prednisone which helped a little. Patient pictures:  large welts of face with some lip swelling which occurred several evenings ago. Picture next morning shows lip swelling and redness is resolved with mild residual periorbital edema. Vitamin B12 deficiency:  patient is taking B12 supplement. Review of Systems   Constitutional:  Negative for chills, fatigue and fever. Respiratory:  Negative for cough and shortness of breath. Having more joint pain. OBJECTIVE:  Blood pressure 112/62, pulse 73, temperature 97.9 °F (36.6 °C), temperature source Temporal, weight 172 lb (78 kg), SpO2 99 %. Physical Exam  CONSTITUTIONAL:  Well developed. No apparent distress. PSYCHIATRIC: Oriented to time, place, person & situation. Appropriate mood and affect. CARDIOVASCULAR:  Regular rate and rhythm. Normal S1, S2. No extra sounds. RESPIRATORY:  Normal effort. Normal ascultation without wheezing. SKIN:  No welts, redness, or swelling of face. Few scattered < 1 cm erythematous macules on B arms. No results found for this visit on 23. An electronic signature was used to authenticate this note.   -- Ryan Cast MD

## 2023-01-05 NOTE — PROGRESS NOTES
Name and  verified. Information on the AVS was explained to patient/guardian and understanding was verbalized. Time was made for question and answers. Patient made aware that a member from our team will contact them with further steps for AN for Allergy.

## 2023-01-06 LAB
25(OH)D3 SERPL-MCNC: 18 NG/ML (ref 30–100)
BASOPHILS # BLD: 0 K/UL (ref 0–0.1)
BASOPHILS NFR BLD: 0 % (ref 0–1)
DIFFERENTIAL METHOD BLD: ABNORMAL
EOSINOPHIL # BLD: 0.2 K/UL (ref 0–0.4)
EOSINOPHIL NFR BLD: 2 % (ref 0–7)
ERYTHROCYTE [DISTWIDTH] IN BLOOD BY AUTOMATED COUNT: 16.6 % (ref 11.5–14.5)
ERYTHROCYTE [SEDIMENTATION RATE] IN BLOOD: 8 MM/HR (ref 0–30)
HCT VFR BLD AUTO: 40 % (ref 35–47)
HGB BLD-MCNC: 12.4 G/DL (ref 11.5–16)
IMM GRANULOCYTES # BLD AUTO: 0 K/UL (ref 0–0.04)
IMM GRANULOCYTES NFR BLD AUTO: 0 % (ref 0–0.5)
LYMPHOCYTES # BLD: 2.4 K/UL (ref 0.8–3.5)
LYMPHOCYTES NFR BLD: 32 % (ref 12–49)
MCH RBC QN AUTO: 25.7 PG (ref 26–34)
MCHC RBC AUTO-ENTMCNC: 31 G/DL (ref 30–36.5)
MCV RBC AUTO: 82.8 FL (ref 80–99)
MONOCYTES # BLD: 0.7 K/UL (ref 0–1)
MONOCYTES NFR BLD: 9 % (ref 5–13)
NEUTS SEG # BLD: 4.2 K/UL (ref 1.8–8)
NEUTS SEG NFR BLD: 57 % (ref 32–75)
NRBC # BLD: 0 K/UL (ref 0–0.01)
NRBC BLD-RTO: 0 PER 100 WBC
PLATELET # BLD AUTO: 377 K/UL (ref 150–400)
PMV BLD AUTO: 12 FL (ref 8.9–12.9)
RBC # BLD AUTO: 4.83 M/UL (ref 3.8–5.2)
VIT B12 SERPL-MCNC: 476 PG/ML (ref 193–986)
WBC # BLD AUTO: 7.4 K/UL (ref 3.6–11)

## 2023-01-09 ENCOUNTER — CLINICAL SUPPORT (OUTPATIENT)
Dept: FAMILY MEDICINE CLINIC | Age: 53
End: 2023-01-09

## 2023-01-09 ENCOUNTER — OFFICE VISIT (OUTPATIENT)
Dept: FAMILY MEDICINE CLINIC | Age: 53
End: 2023-01-09

## 2023-01-09 DIAGNOSIS — Z71.89 COUNSELING AND COORDINATION OF CARE: Primary | ICD-10-CM

## 2023-01-09 PROCEDURE — 99080 SPECIAL REPORTS OR FORMS: CPT | Performed by: FAMILY MEDICINE

## 2023-01-09 NOTE — PROGRESS NOTES
Pt arrived for JULIETTE form, but it was discovered that the ED visit was charted under her other chart Cyn Roman). NN notified. No further questions/concerns.

## 2023-01-09 NOTE — PROGRESS NOTES
Spoke with patient and reviewed results. Stable blood counts. No inflammation. B12 is better, Vitamin D is still low. She is taking a OTC Vitamin D supplement already and so will change to Rx dose.   eRxd to [facility]